# Patient Record
Sex: MALE | Race: WHITE | NOT HISPANIC OR LATINO
[De-identification: names, ages, dates, MRNs, and addresses within clinical notes are randomized per-mention and may not be internally consistent; named-entity substitution may affect disease eponyms.]

---

## 2021-05-12 ENCOUNTER — APPOINTMENT (OUTPATIENT)
Dept: ORTHOPEDIC SURGERY | Facility: CLINIC | Age: 56
End: 2021-05-12
Payer: COMMERCIAL

## 2021-05-12 ENCOUNTER — RESULT REVIEW (OUTPATIENT)
Age: 56
End: 2021-05-12

## 2021-05-12 ENCOUNTER — OUTPATIENT (OUTPATIENT)
Dept: OUTPATIENT SERVICES | Facility: HOSPITAL | Age: 56
LOS: 1 days | End: 2021-05-12
Payer: COMMERCIAL

## 2021-05-12 VITALS — HEIGHT: 73 IN | BODY MASS INDEX: 27.17 KG/M2 | WEIGHT: 205 LBS | RESPIRATION RATE: 16 BRPM

## 2021-05-12 DIAGNOSIS — Z78.9 OTHER SPECIFIED HEALTH STATUS: ICD-10-CM

## 2021-05-12 PROCEDURE — 99072 ADDL SUPL MATRL&STAF TM PHE: CPT

## 2021-05-12 PROCEDURE — 29515 APPLICATION SHORT LEG SPLINT: CPT | Mod: LT

## 2021-05-12 PROCEDURE — 73610 X-RAY EXAM OF ANKLE: CPT | Mod: 26,LT

## 2021-05-12 PROCEDURE — 73630 X-RAY EXAM OF FOOT: CPT | Mod: 26,LT

## 2021-05-12 PROCEDURE — 77071 MNL APPL STRS JT RADIOGRAPHY: CPT

## 2021-05-12 PROCEDURE — 99204 OFFICE O/P NEW MOD 45 MIN: CPT | Mod: 25

## 2021-05-12 PROCEDURE — 73610 X-RAY EXAM OF ANKLE: CPT

## 2021-05-12 PROCEDURE — 73630 X-RAY EXAM OF FOOT: CPT

## 2021-05-12 RX ORDER — IBUPROFEN 200 MG
600 CAPSULE ORAL
Qty: 90 | Refills: 0 | Status: ACTIVE | COMMUNITY
Start: 2021-05-12 | End: 1900-01-01

## 2021-05-15 ENCOUNTER — LABORATORY RESULT (OUTPATIENT)
Age: 56
End: 2021-05-15

## 2021-05-18 ENCOUNTER — APPOINTMENT (OUTPATIENT)
Dept: ORTHOPEDIC SURGERY | Facility: AMBULATORY SURGERY CENTER | Age: 56
End: 2021-05-18

## 2021-05-18 ENCOUNTER — OUTPATIENT (OUTPATIENT)
Dept: OUTPATIENT SERVICES | Facility: HOSPITAL | Age: 56
LOS: 1 days | Discharge: HOME | End: 2021-05-18
Payer: COMMERCIAL

## 2021-05-18 PROCEDURE — 77071 MNL APPL STRS JT RADIOGRAPHY: CPT

## 2021-05-18 PROCEDURE — 27695 REPAIR OF ANKLE LIGAMENT: CPT | Mod: 59,LT

## 2021-05-18 PROCEDURE — 27792 TREATMENT OF ANKLE FRACTURE: CPT | Mod: LT

## 2021-05-18 PROCEDURE — 27829 TREAT LOWER LEG JOINT: CPT | Mod: LT

## 2021-05-18 PROCEDURE — 29898 ANKLE ARTHROSCOPY/SURGERY: CPT | Mod: LT

## 2021-05-19 ENCOUNTER — NON-APPOINTMENT (OUTPATIENT)
Age: 56
End: 2021-05-19

## 2021-06-02 ENCOUNTER — APPOINTMENT (OUTPATIENT)
Dept: ORTHOPEDIC SURGERY | Facility: CLINIC | Age: 56
End: 2021-06-02
Payer: COMMERCIAL

## 2021-06-02 PROCEDURE — 99024 POSTOP FOLLOW-UP VISIT: CPT

## 2021-06-04 ENCOUNTER — NON-APPOINTMENT (OUTPATIENT)
Age: 56
End: 2021-06-04

## 2021-06-30 ENCOUNTER — RESULT REVIEW (OUTPATIENT)
Age: 56
End: 2021-06-30

## 2021-06-30 ENCOUNTER — APPOINTMENT (OUTPATIENT)
Dept: ORTHOPEDIC SURGERY | Facility: CLINIC | Age: 56
End: 2021-06-30
Payer: COMMERCIAL

## 2021-06-30 ENCOUNTER — OUTPATIENT (OUTPATIENT)
Dept: OUTPATIENT SERVICES | Facility: HOSPITAL | Age: 56
LOS: 1 days | End: 2021-06-30
Payer: COMMERCIAL

## 2021-06-30 VITALS — HEIGHT: 73 IN | BODY MASS INDEX: 27.17 KG/M2 | WEIGHT: 205 LBS | RESPIRATION RATE: 16 BRPM

## 2021-06-30 PROCEDURE — 73610 X-RAY EXAM OF ANKLE: CPT

## 2021-06-30 PROCEDURE — 99024 POSTOP FOLLOW-UP VISIT: CPT

## 2021-06-30 PROCEDURE — 73610 X-RAY EXAM OF ANKLE: CPT | Mod: 26,LT

## 2021-06-30 RX ORDER — ASPIRIN 325 MG/1
325 TABLET, FILM COATED ORAL TWICE DAILY
Qty: 60 | Refills: 0 | Status: DISCONTINUED | COMMUNITY
Start: 2021-05-12 | End: 2021-06-30

## 2021-06-30 RX ORDER — GABAPENTIN 300 MG/1
300 CAPSULE ORAL DAILY
Qty: 110 | Refills: 0 | Status: DISCONTINUED | COMMUNITY
Start: 2021-05-12 | End: 2021-06-30

## 2021-06-30 RX ORDER — ONDANSETRON 4 MG/1
4 TABLET ORAL EVERY 8 HOURS
Qty: 15 | Refills: 2 | Status: DISCONTINUED | COMMUNITY
Start: 2021-05-12 | End: 2021-06-30

## 2021-06-30 RX ORDER — OXYCODONE 5 MG/1
5 TABLET ORAL
Qty: 40 | Refills: 0 | Status: DISCONTINUED | COMMUNITY
Start: 2021-05-12 | End: 2021-06-30

## 2021-06-30 RX ORDER — HYDROMORPHONE HYDROCHLORIDE 2 MG/1
2 TABLET ORAL
Qty: 30 | Refills: 0 | Status: DISCONTINUED | COMMUNITY
Start: 2021-05-20 | End: 2021-06-30

## 2021-06-30 RX ORDER — ACETAMINOPHEN 500 MG/1
500 TABLET ORAL
Qty: 60 | Refills: 1 | Status: DISCONTINUED | COMMUNITY
Start: 2021-05-12 | End: 2021-06-30

## 2021-06-30 RX ORDER — DOCUSATE SODIUM 100 MG/1
100 CAPSULE ORAL TWICE DAILY
Qty: 28 | Refills: 3 | Status: DISCONTINUED | COMMUNITY
Start: 2021-05-12 | End: 2021-06-30

## 2021-07-13 ENCOUNTER — NON-APPOINTMENT (OUTPATIENT)
Age: 56
End: 2021-07-13

## 2021-07-22 ENCOUNTER — APPOINTMENT (OUTPATIENT)
Dept: FAMILY MEDICINE | Facility: CLINIC | Age: 56
End: 2021-07-22
Payer: COMMERCIAL

## 2021-07-22 VITALS
TEMPERATURE: 98.3 F | BODY MASS INDEX: 28.36 KG/M2 | WEIGHT: 214 LBS | HEART RATE: 83 BPM | HEIGHT: 73 IN | OXYGEN SATURATION: 96 % | DIASTOLIC BLOOD PRESSURE: 81 MMHG | SYSTOLIC BLOOD PRESSURE: 119 MMHG

## 2021-07-22 DIAGNOSIS — Z80.1 FAMILY HISTORY OF MALIGNANT NEOPLASM OF TRACHEA, BRONCHUS AND LUNG: ICD-10-CM

## 2021-07-22 DIAGNOSIS — Z80.0 FAMILY HISTORY OF MALIGNANT NEOPLASM OF DIGESTIVE ORGANS: ICD-10-CM

## 2021-07-22 DIAGNOSIS — Z84.1 FAMILY HISTORY OF DISORDERS OF KIDNEY AND URETER: ICD-10-CM

## 2021-07-22 DIAGNOSIS — Z86.39 PERSONAL HISTORY OF OTHER ENDOCRINE, NUTRITIONAL AND METABOLIC DISEASE: ICD-10-CM

## 2021-07-22 PROCEDURE — 36415 COLL VENOUS BLD VENIPUNCTURE: CPT

## 2021-07-22 PROCEDURE — G0442 ANNUAL ALCOHOL SCREEN 15 MIN: CPT

## 2021-07-22 PROCEDURE — 99386 PREV VISIT NEW AGE 40-64: CPT | Mod: 25

## 2021-07-22 RX ORDER — TRAMADOL HYDROCHLORIDE 50 MG/1
50 TABLET, COATED ORAL
Qty: 40 | Refills: 0 | Status: DISCONTINUED | COMMUNITY
Start: 2021-07-13 | End: 2021-07-22

## 2021-07-22 NOTE — PLAN
[FreeTextEntry1] : follow up labs, labs drawn in office \par pt. deferred ekg today \par \par extensive discussion on importance of covid 19 vaccine

## 2021-07-22 NOTE — HISTORY OF PRESENT ILLNESS
[FreeTextEntry1] : establish care [de-identified] : 55 yo m presents to establish care. \par Last physical was a few years ago. \par Has not had a PCP visit in years. \par All was normal. \par

## 2021-07-22 NOTE — PHYSICAL EXAM
[Normal Sclera/Conjunctiva] : normal sclera/conjunctiva [Normal Outer Ear/Nose] : the outer ears and nose were normal in appearance [No JVD] : no jugular venous distention [Normal] : affect was normal and insight and judgment were intact [de-identified] : skin lesion on scalp, teardrop sized

## 2021-07-22 NOTE — HEALTH RISK ASSESSMENT
[Good] : ~his/her~  mood as  good [1 or 2 (0 pts)] : 1 or 2 (0 points) [Never (0 pts)] : Never (0 points) [No] : In the past 12 months have you used drugs other than those required for medical reasons? No [0] : 2) Feeling down, depressed, or hopeless: Not at all (0) [PHQ-2 Negative - No further assessment needed] : PHQ-2 Negative - No further assessment needed [Patient declined colonoscopy] : Patient declined colonoscopy [HIV test declined] : HIV test declined [Hepatitis C test declined] : Hepatitis C test declined [None] : None [With Significant Other] : lives with significant other [Employed] : employed [Significant Other] : lives with significant other [Sexually Active] : sexually active [Feels Safe at Home] : Feels safe at home [Fully functional (bathing, dressing, toileting, transferring, walking, feeding)] : Fully functional (bathing, dressing, toileting, transferring, walking, feeding) [Fully functional (using the telephone, shopping, preparing meals, housekeeping, doing laundry, using] : Fully functional and needs no help or supervision to perform IADLs (using the telephone, shopping, preparing meals, housekeeping, doing laundry, using transportation, managing medications and managing finances) [] : No [Audit-CScore] : 0 [de-identified] : limited [de-identified] : fruits, veggies  [[id number]] : 0 [Change in mental status noted] : No change in mental status noted [Language] : denies difficulty with language [High Risk Behavior] : no high risk behavior [Reports changes in hearing] : Reports no changes in hearing [Reports changes in vision] : Reports no changes in vision [FreeTextEntry2] : construction,

## 2021-07-28 LAB
25(OH)D3 SERPL-MCNC: 44.3 NG/ML
ALBUMIN SERPL ELPH-MCNC: 5 G/DL
ALP BLD-CCNC: 63 U/L
ALT SERPL-CCNC: 19 U/L
ANION GAP SERPL CALC-SCNC: 12 MMOL/L
APPEARANCE: CLEAR
AST SERPL-CCNC: 18 U/L
BASOPHILS # BLD AUTO: 0.06 K/UL
BASOPHILS NFR BLD AUTO: 1 %
BILIRUB SERPL-MCNC: 0.3 MG/DL
BILIRUBIN URINE: NEGATIVE
BLOOD URINE: NORMAL
BUN SERPL-MCNC: 17 MG/DL
CALCIUM SERPL-MCNC: 10.3 MG/DL
CHLORIDE SERPL-SCNC: 101 MMOL/L
CHOLEST SERPL-MCNC: 181 MG/DL
CO2 SERPL-SCNC: 24 MMOL/L
COLOR: YELLOW
CREAT SERPL-MCNC: 1.01 MG/DL
EOSINOPHIL # BLD AUTO: 0.11 K/UL
EOSINOPHIL NFR BLD AUTO: 1.9 %
ESTIMATED AVERAGE GLUCOSE: 103 MG/DL
GLUCOSE QUALITATIVE U: NEGATIVE
GLUCOSE SERPL-MCNC: 94 MG/DL
HBA1C MFR BLD HPLC: 5.2 %
HCT VFR BLD CALC: 43.8 %
HDLC SERPL-MCNC: 56 MG/DL
HGB BLD-MCNC: 14.6 G/DL
IMM GRANULOCYTES NFR BLD AUTO: 0.2 %
KETONES URINE: NEGATIVE
LDLC SERPL CALC-MCNC: 104 MG/DL
LEUKOCYTE ESTERASE URINE: NEGATIVE
LYMPHOCYTES # BLD AUTO: 1.79 K/UL
LYMPHOCYTES NFR BLD AUTO: 30.3 %
MAN DIFF?: NORMAL
MCHC RBC-ENTMCNC: 31.1 PG
MCHC RBC-ENTMCNC: 33.3 GM/DL
MCV RBC AUTO: 93.4 FL
MONOCYTES # BLD AUTO: 0.55 K/UL
MONOCYTES NFR BLD AUTO: 9.3 %
NEUTROPHILS # BLD AUTO: 3.39 K/UL
NEUTROPHILS NFR BLD AUTO: 57.3 %
NITRITE URINE: NEGATIVE
NONHDLC SERPL-MCNC: 125 MG/DL
PH URINE: 6
PLATELET # BLD AUTO: 242 K/UL
POTASSIUM SERPL-SCNC: 4.1 MMOL/L
PROT SERPL-MCNC: 7.3 G/DL
PROTEIN URINE: NEGATIVE
PSA FREE FLD-MCNC: 24 %
PSA FREE SERPL-MCNC: 1.09 NG/ML
PSA SERPL-MCNC: 4.58 NG/ML
RBC # BLD: 4.69 M/UL
RBC # FLD: 13.3 %
SODIUM SERPL-SCNC: 138 MMOL/L
SPECIFIC GRAVITY URINE: 1.02
TRIGL SERPL-MCNC: 105 MG/DL
TSH SERPL-ACNC: 3.69 UIU/ML
UROBILINOGEN URINE: NORMAL
WBC # FLD AUTO: 5.91 K/UL

## 2021-08-04 ENCOUNTER — APPOINTMENT (OUTPATIENT)
Dept: FAMILY MEDICINE | Facility: CLINIC | Age: 56
End: 2021-08-04
Payer: COMMERCIAL

## 2021-08-04 PROCEDURE — 99213 OFFICE O/P EST LOW 20 MIN: CPT | Mod: 95

## 2021-08-11 ENCOUNTER — RESULT REVIEW (OUTPATIENT)
Age: 56
End: 2021-08-11

## 2021-08-11 ENCOUNTER — OUTPATIENT (OUTPATIENT)
Dept: OUTPATIENT SERVICES | Facility: HOSPITAL | Age: 56
LOS: 1 days | End: 2021-08-11
Payer: COMMERCIAL

## 2021-08-11 ENCOUNTER — APPOINTMENT (OUTPATIENT)
Dept: ORTHOPEDIC SURGERY | Facility: CLINIC | Age: 56
End: 2021-08-11
Payer: COMMERCIAL

## 2021-08-11 VITALS — RESPIRATION RATE: 16 BRPM | HEIGHT: 73 IN | WEIGHT: 214 LBS | BODY MASS INDEX: 28.36 KG/M2

## 2021-08-11 PROCEDURE — 99024 POSTOP FOLLOW-UP VISIT: CPT

## 2021-08-11 PROCEDURE — 73610 X-RAY EXAM OF ANKLE: CPT

## 2021-08-11 PROCEDURE — 73610 X-RAY EXAM OF ANKLE: CPT | Mod: 26,LT

## 2021-08-11 RX ORDER — MELOXICAM 15 MG/1
15 TABLET ORAL
Qty: 90 | Refills: 0 | Status: ACTIVE | COMMUNITY
Start: 2021-08-11 | End: 1900-01-01

## 2021-08-23 ENCOUNTER — APPOINTMENT (OUTPATIENT)
Dept: DERMATOLOGY | Facility: CLINIC | Age: 56
End: 2021-08-23

## 2021-08-25 NOTE — PLAN
[FreeTextEntry1] : reviewed risks, benefits of meds\par discussed importance of taking daily, 30 min prior to food and drink \par will follow up in 8 weeks to review how med is working

## 2021-08-25 NOTE — HISTORY OF PRESENT ILLNESS
[Home] : at home, [unfilled] , at the time of the visit. [Medical Office: ([facility])___] : at the medical office located in  [Verbal consent obtained from patient] : the patient, [unfilled] [FreeTextEntry8] : cc: thyroid meds \par \par 57 yo m presents feeling fatigued. \par Interested in adjusting dose of thyroid meds. \par Takes daily in AM 30 min prior to first meal. \par Feels fatigued. \par Feels as though would benefit from change in dose. \par No complaints today. \par Still not interested in vaccination.

## 2021-08-30 ENCOUNTER — APPOINTMENT (OUTPATIENT)
Dept: UROLOGY | Facility: CLINIC | Age: 56
End: 2021-08-30
Payer: COMMERCIAL

## 2021-08-30 VITALS — HEART RATE: 91 BPM | TEMPERATURE: 98.1 F | DIASTOLIC BLOOD PRESSURE: 78 MMHG | SYSTOLIC BLOOD PRESSURE: 120 MMHG

## 2021-08-30 PROCEDURE — 51798 US URINE CAPACITY MEASURE: CPT

## 2021-08-30 PROCEDURE — 99204 OFFICE O/P NEW MOD 45 MIN: CPT | Mod: 25

## 2021-08-31 NOTE — ASSESSMENT
[FreeTextEntry1] : 56 year old male with Hx of hypothyroidism and BPH with elevated PSA.\par father - had prostate cancer. \par \par BPH with LUTS\par Discussed etiology and other treatment options\par Recommend fluid reduction and coffee intake (1 cup/day) \par Tamsulosin 0.4 mg, informed patient of use and s/e\par PVR 66 ml - r/o urinary retention\par \par Elevated PSA\par previous biopsy in 2008 was negative per patient\par PSA 4.58 July 2021.\par \par Repeat PSA , UA, UCX\par MRI prostate w/wo contrast  - if PSA is elevated (>3.5)\par \par Follow up in 6 weeks\par

## 2021-08-31 NOTE — PHYSICAL EXAM
[General Appearance - Well Developed] : well developed [General Appearance - Well Nourished] : well nourished [Normal Appearance] : normal appearance [Well Groomed] : well groomed [General Appearance - In No Acute Distress] : no acute distress [Edema] : no peripheral edema [Respiration, Rhythm And Depth] : normal respiratory rhythm and effort [Exaggerated Use Of Accessory Muscles For Inspiration] : no accessory muscle use [Abdomen Soft] : soft [Abdomen Tenderness] : non-tender [Costovertebral Angle Tenderness] : no ~M costovertebral angle tenderness [Urethral Meatus] : meatus normal [Urinary Bladder Findings] : the bladder was normal on palpation [Scrotum] : the scrotum was normal [Testes Mass (___cm)] : there were no testicular masses [No Prostate Nodules] : no prostate nodules [Normal Station and Gait] : the gait and station were normal for the patient's age [] : no rash [No Focal Deficits] : no focal deficits [Oriented To Time, Place, And Person] : oriented to person, place, and time [Affect] : the affect was normal [Mood] : the mood was normal [Not Anxious] : not anxious [No Palpable Adenopathy] : no palpable adenopathy [FreeTextEntry1] : left ankle fracture s/p surgical repair

## 2021-08-31 NOTE — HISTORY OF PRESENT ILLNESS
[Currently Experiencing ___] :  [unfilled] [Urinary Urgency] : urinary urgency [Urinary Frequency] : urinary frequency [Nocturia] : nocturia [Straining] : straining [Weak Stream] : weak stream [Post-Void Dribbling] : post-void dribbling [None] : None [FreeTextEntry1] : This is a 56 year old male with Hx of hypothyroidism and BPH.  Referred by Dr Charles\par Previously seen by urologist at [place]. Patient reports prostate biopsy in 2008, negative per patient. ?Elevated PSA at that time, patient unsure. \par Presents to clinic to establish care for BPH with elevated PSA\par PSA 4.58 July 2021.\par patient complaints of urinary urgency, frequency, nocturia 6-7X, post void dribbling, weak to moderate FOS with straining. \par Patient is currently taking tamsulosin 0.4 mg from his friend.\par Denies dysuria, hematuria, flank pain\par +Claustrophobia\par         \par Social Hx: denies smoking or alcohol \par FHx: Father - prostate cancer

## 2021-08-31 NOTE — END OF VISIT
[FreeTextEntry3] : 57yo male with h/o BPH, h/o elevated PSA, prior biopsy several years ago for unknown indication, presume elevated PSA, +family history of PCa. Reviewed  PCa screening and options. Discussed MRI but patient is claustrophobic. He agreed to proceeding with biopsy if repeat PSA > 3.5. Discussed tamsulosin and behavioral modification. F/u 6 weeks.

## 2021-09-01 ENCOUNTER — NON-APPOINTMENT (OUTPATIENT)
Age: 56
End: 2021-09-01

## 2021-10-06 ENCOUNTER — APPOINTMENT (OUTPATIENT)
Dept: ORTHOPEDIC SURGERY | Facility: CLINIC | Age: 56
End: 2021-10-06
Payer: COMMERCIAL

## 2021-10-06 DIAGNOSIS — S82.832A OTHER FRACTURE OF UPPER AND LOWER END OF LEFT FIBULA, INITIAL ENCOUNTER FOR CLOSED FRACTURE: ICD-10-CM

## 2021-10-06 DIAGNOSIS — S90.522A BLISTER (NONTHERMAL), LEFT ANKLE, INITIAL ENCOUNTER: ICD-10-CM

## 2021-10-06 DIAGNOSIS — M25.572 PAIN IN LEFT ANKLE AND JOINTS OF LEFT FOOT: ICD-10-CM

## 2021-10-06 PROCEDURE — 99213 OFFICE O/P EST LOW 20 MIN: CPT

## 2021-10-07 VITALS — RESPIRATION RATE: 16 BRPM | HEIGHT: 73 IN | WEIGHT: 214 LBS | BODY MASS INDEX: 28.36 KG/M2

## 2021-10-15 ENCOUNTER — APPOINTMENT (OUTPATIENT)
Dept: PHYSICAL MEDICINE AND REHAB | Facility: CLINIC | Age: 56
End: 2021-10-15
Payer: COMMERCIAL

## 2021-10-15 VITALS — OXYGEN SATURATION: 97 % | WEIGHT: 214 LBS | RESPIRATION RATE: 16 BRPM | HEIGHT: 73 IN | BODY MASS INDEX: 28.36 KG/M2

## 2021-10-15 PROCEDURE — 99202 OFFICE O/P NEW SF 15 MIN: CPT

## 2021-10-26 NOTE — HISTORY OF PRESENT ILLNESS
[FreeTextEntry1] : Referring Physician: Dr. Darrell Christopher MD \par \par Mr. DREW GOODMAN is a very pleasant 56 year male who comes in for evaluation of LEFT LEG PAIN that has been ongoing for 4 weeks without any specific injury or inciting event. Patient has tried Meloxicam which helps relief. The pain is located primarily on his LEFT LEG intermittent and described as achy and sharp. The pain is rated as 3/10 and ranges from 3-9/10. The patient's symptoms are aggravated by weight bearing and alleviated by nothing. The patient is currently not working. The patient denies any night pain, numbness/tingling, weakness, or bowel/bladder dysfunction. The patient has no other complaints at this time.\par

## 2021-10-26 NOTE — ASSESSMENT
[FreeTextEntry1] : Impression:\par 1. LEFT S1 Radiculopathy\par \par Plan: The history and physical examination were reviewed. The diagnosis was discussed with the patient along with treatment options including physical therapy, oral medication, interventional spine procedures, and surgery; as well as alternative therapeutics such as acupuncture and massage. We also discussed the importance of biomechanics, ergonomics, and posture as they pertain to the current condition as well as overall health and well being. I am offered the patient a course of physical therapy,  however he would like to proceed with HEP for now. A written program was provided and review in the office today. The patient was educated on red flag symptoms and was instructed to call the office should the current condition worsen or new symptoms develop. The patient will follow up in 2-3 months. The patient expressed verbal understanding and is in agreement with the plan of care. All of the patient's questions and concerns were addressed during today's visit.

## 2021-10-26 NOTE — PHYSICAL EXAM
[FreeTextEntry1] : LUMBAR\par GEN: AAOx3. NAD.\par INSP: Spine alignment midline. No evidence of scoliosis.\par STANCE: Trendelenburg/SLS (-) R (-) L. \par GAIT: (-) Antalgic. Normal reciprocating heel to toe\par SENS: Grossly intact to LT BLE.\par REFLEXES: Symmetric patella, achilles. \par TONE: Normal. No clonus.\par SPECIAL: SLR/Slump (-) R (+) L.   Gaenslen (-) R (-) L.\par                 FADIR (-) R (-) L.          WIN (-) R (-) L.  \par PALP: Midline/Spinous processes (-).   Paraspinals (-) R  (-) L.   \par            QL (-) R (-) L.      SIJ (-) R (-) L.            GTB (-) R (-) L.\par \par LS ROM:                                                              \par Flex               Full. Axial Sx (-). LLE Sx (+)\par Ext                 Full. Axial Sx (-). BLE Sx (-)\par Lat Flex         Full. Axial Sx (-). BLE Sx (-)       \par Rotation         Full. Axial Sx (-). BLE Sx (-) \par Oblique Ext   Full. Axial Sx (-). BLE Sx (-)  \par \par HIP ROM:      RIGHT           LEFT\par Flex             Full. (-)          Full. (-)\par IR                 Full. (-)          Full. (-)\par ER                Full. (-)          Full. (-)\par \par STRENGTH:    RIGHT      LEFT\par Hip Flex            5/5 5/5\par Hip ABd            5/5 5/5\par Knee Ext           5/5 5/5\par Ankle DF           5/5 5/5\par Ankle PF           5/5 5/5\par EHL                   5/5 5/5\par EV                    5/5 5/5

## 2021-11-22 ENCOUNTER — APPOINTMENT (OUTPATIENT)
Dept: ORTHOPEDIC SURGERY | Facility: CLINIC | Age: 56
End: 2021-11-22
Payer: COMMERCIAL

## 2021-11-22 VITALS — RESPIRATION RATE: 16 BRPM | BODY MASS INDEX: 28.36 KG/M2 | WEIGHT: 214 LBS | HEIGHT: 73 IN

## 2021-11-22 PROCEDURE — 99213 OFFICE O/P EST LOW 20 MIN: CPT

## 2021-11-22 PROCEDURE — 73610 X-RAY EXAM OF ANKLE: CPT | Mod: LT

## 2021-11-30 PROBLEM — S90.522A: Status: RESOLVED | Noted: 2021-05-12 | Resolved: 2021-07-22

## 2021-11-30 PROBLEM — S82.832A OTHER CLOSED FRACTURE OF DISTAL END OF LEFT FIBULA, INITIAL ENCOUNTER: Status: RESOLVED | Noted: 2021-05-12 | Resolved: 2021-07-22

## 2021-11-30 PROBLEM — M25.572 ACUTE LEFT ANKLE PAIN: Status: RESOLVED | Noted: 2021-05-12 | Resolved: 2021-07-22

## 2021-12-09 ENCOUNTER — APPOINTMENT (OUTPATIENT)
Dept: FAMILY MEDICINE | Facility: CLINIC | Age: 56
End: 2021-12-09

## 2021-12-27 ENCOUNTER — APPOINTMENT (OUTPATIENT)
Dept: FAMILY MEDICINE | Facility: CLINIC | Age: 56
End: 2021-12-27

## 2022-01-05 ENCOUNTER — RX RENEWAL (OUTPATIENT)
Age: 57
End: 2022-01-05

## 2022-02-18 ENCOUNTER — APPOINTMENT (OUTPATIENT)
Dept: ORTHOPEDIC SURGERY | Facility: CLINIC | Age: 57
End: 2022-02-18

## 2022-02-25 ENCOUNTER — APPOINTMENT (OUTPATIENT)
Dept: FAMILY MEDICINE | Facility: CLINIC | Age: 57
End: 2022-02-25
Payer: COMMERCIAL

## 2022-02-25 ENCOUNTER — LABORATORY RESULT (OUTPATIENT)
Age: 57
End: 2022-02-25

## 2022-02-25 VITALS
HEIGHT: 73 IN | WEIGHT: 218 LBS | DIASTOLIC BLOOD PRESSURE: 79 MMHG | TEMPERATURE: 97.7 F | BODY MASS INDEX: 28.89 KG/M2 | HEART RATE: 90 BPM | SYSTOLIC BLOOD PRESSURE: 120 MMHG | OXYGEN SATURATION: 100 %

## 2022-02-25 PROCEDURE — 99214 OFFICE O/P EST MOD 30 MIN: CPT | Mod: 25

## 2022-02-25 PROCEDURE — 36415 COLL VENOUS BLD VENIPUNCTURE: CPT

## 2022-02-25 PROCEDURE — 81002 URINALYSIS NONAUTO W/O SCOPE: CPT

## 2022-02-25 RX ORDER — TAMSULOSIN HYDROCHLORIDE 0.4 MG/1
0.4 CAPSULE ORAL
Qty: 30 | Refills: 3 | Status: DISCONTINUED | COMMUNITY
Start: 2021-08-30 | End: 2022-02-25

## 2022-02-25 RX ORDER — BACITRACIN 500 [IU]/G
500 OINTMENT TOPICAL TWICE DAILY
Qty: 1 | Refills: 0 | Status: ACTIVE | COMMUNITY
Start: 2022-02-25 | End: 1900-01-01

## 2022-02-25 NOTE — PLAN
[FreeTextEntry1] : follow up labs, labs drawn in office \par will follow up urine as well \par \par anxiety with flying \par will do trial of benzo, only for flight \par patient aware not to mix with drugs or alcohol \par will take one 30 min before boarding, will take another midflight if needed \par aware of risk of dependency \par mentioned has used them before for flying

## 2022-02-25 NOTE — HISTORY OF PRESENT ILLNESS
[FreeTextEntry1] : follow up thyroid \par follow up cholesterol \par follow up PSA \par claustophobia  [de-identified] : 55 yo m presents to discuss his thyroid. \par Was on a new dose of thyroid medication, interested in repeating labs. \par \par Cholesterol was elevated last visit as well. Here to repeat it today as well. \par \par PSA was elevated last visit as well, was followed with urology. Here to repeat it. \par \par Mentioned claustrophobic, worse with flying, in need of a medication to assist.

## 2022-03-01 ENCOUNTER — NON-APPOINTMENT (OUTPATIENT)
Age: 57
End: 2022-03-01

## 2022-03-01 LAB
ALBUMIN SERPL ELPH-MCNC: 4.9 G/DL
ALP BLD-CCNC: 56 U/L
ALT SERPL-CCNC: 24 U/L
ANION GAP SERPL CALC-SCNC: 13 MMOL/L
APPEARANCE: CLEAR
APPEARANCE: CLEAR
AST SERPL-CCNC: 35 U/L
BACTERIA: NEGATIVE
BILIRUB SERPL-MCNC: 0.3 MG/DL
BILIRUBIN URINE: NEGATIVE
BILIRUBIN URINE: NEGATIVE
BLOOD URINE: ABNORMAL
BLOOD URINE: ABNORMAL
BUN SERPL-MCNC: 20 MG/DL
CALCIUM SERPL-MCNC: 9.9 MG/DL
CHLORIDE SERPL-SCNC: 105 MMOL/L
CHOLEST SERPL-MCNC: 154 MG/DL
CO2 SERPL-SCNC: 22 MMOL/L
COLOR: NORMAL
COLOR: NORMAL
CREAT SERPL-MCNC: 1.04 MG/DL
ESTIMATED AVERAGE GLUCOSE: 114 MG/DL
GLUCOSE QUALITATIVE U: NEGATIVE
GLUCOSE QUALITATIVE U: NEGATIVE
GLUCOSE SERPL-MCNC: 100 MG/DL
HBA1C MFR BLD HPLC: 5.6 %
HDLC SERPL-MCNC: 53 MG/DL
HYALINE CASTS: 1 /LPF
KETONES URINE: NEGATIVE
KETONES URINE: NEGATIVE
LDLC SERPL CALC-MCNC: 83 MG/DL
LEUKOCYTE ESTERASE URINE: NEGATIVE
LEUKOCYTE ESTERASE URINE: NEGATIVE
MICROSCOPIC-UA: NORMAL
NITRITE URINE: NEGATIVE
NITRITE URINE: NEGATIVE
NONHDLC SERPL-MCNC: 101 MG/DL
PH URINE: 6.5
PH URINE: 6.5
POTASSIUM SERPL-SCNC: 4.3 MMOL/L
PROT SERPL-MCNC: 7.2 G/DL
PROTEIN URINE: NEGATIVE
PROTEIN URINE: NEGATIVE
PSA FREE FLD-MCNC: 14 %
PSA FREE SERPL-MCNC: 0.46 NG/ML
PSA SERPL-MCNC: 3.32 NG/ML
RED BLOOD CELLS URINE: 7 /HPF
SODIUM SERPL-SCNC: 140 MMOL/L
SPECIFIC GRAVITY URINE: 1.02
SPECIFIC GRAVITY URINE: 1.02
SQUAMOUS EPITHELIAL CELLS: 0 /HPF
TRIGL SERPL-MCNC: 90 MG/DL
TSH SERPL-ACNC: 1.17 UIU/ML
UROBILINOGEN URINE: NORMAL
UROBILINOGEN URINE: NORMAL
WHITE BLOOD CELLS URINE: 0 /HPF

## 2022-03-09 ENCOUNTER — APPOINTMENT (OUTPATIENT)
Dept: UROLOGY | Facility: CLINIC | Age: 57
End: 2022-03-09
Payer: COMMERCIAL

## 2022-03-09 VITALS
SYSTOLIC BLOOD PRESSURE: 125 MMHG | RESPIRATION RATE: 16 BRPM | DIASTOLIC BLOOD PRESSURE: 86 MMHG | HEIGHT: 73 IN | TEMPERATURE: 97.8 F | BODY MASS INDEX: 28.49 KG/M2 | WEIGHT: 215 LBS | HEART RATE: 68 BPM

## 2022-03-09 LAB
BILIRUB UR QL STRIP: NORMAL
CLARITY UR: CLEAR
COLLECTION METHOD: NORMAL
GLUCOSE UR-MCNC: NORMAL
HCG UR QL: 0.2 EU/DL
HGB UR QL STRIP.AUTO: NORMAL
KETONES UR-MCNC: NORMAL
LEUKOCYTE ESTERASE UR QL STRIP: NORMAL
NITRITE UR QL STRIP: NORMAL
PH UR STRIP: 5.5
PROT UR STRIP-MCNC: NORMAL
SP GR UR STRIP: 1.02

## 2022-03-09 PROCEDURE — 81003 URINALYSIS AUTO W/O SCOPE: CPT | Mod: QW

## 2022-03-09 PROCEDURE — 99214 OFFICE O/P EST MOD 30 MIN: CPT

## 2022-03-09 NOTE — HISTORY OF PRESENT ILLNESS
[FreeTextEntry1] : 56 year old man with history of elevated PSA, LUTS, presents with microscopic hematuria.\par \par Patient presents today for microscopic hematuria (7 RBCs pHPF). No prior gross hematuria or microscopic hematuria. 10 year smoking history. No personal or family history of  malignancy. No prior kidney stones or trauma.\par \par He previously saw Dr. Peres in 2021 for elevated PSA. It was 4.58 at that time. He was supposed to get a repeat PSA with possible MRI vs biopsy, however, this was not completed. He had a repeat PSA with Dr. Landon last week that was 3.32. Negative prostate biopsy in 2008.\par \par He has chronic LUTS characterized by urgency, frequency, nocturia, dribbling, weak stream. He has tried flomax in the past, but stopped due to side effects. He did not feel the benefit was worth taking the medication. He denies fevers, chills, dysuria.\par \par

## 2022-03-09 NOTE — ASSESSMENT
[FreeTextEntry1] : Patient with microscopic hematuria. We discussed the typical causes and implication for hematuria. I stated that blood in the urine may be due to a multitude of different reasons including urinary stone disease, infection, and trauma. The most concerning cause is an occult malignancy. For patients with microscopic hematuria, the risk is low (<5%) and elevate among those with gross hematuria (up to 15%). I have recommended formal hematuria evaluation with CT urogram, cystoscopy and urine cytology.\par \par Regarding chronic LUTS, patient has tried medication in the past and is not currently interested in exploring further treatment. He has a history of elevated PSA, up to 4.6 in 2021 and down to 3.3 in 2/2022. He had a negative prostate biopsy in 2008. Given normalization of PSA at this time, no further workup needed. Plan to repeat PSA yearly.\par \par  - CT urogram, cystoscopy and urine cytology\par  - PSA yearly\par

## 2022-03-09 NOTE — LETTER BODY
[Dear  ___] : Dear  [unfilled], [Courtesy Letter:] : I had the pleasure of seeing your patient, [unfilled], in my office today. [Please see my note below.] : Please see my note below. [Sincerely,] : Sincerely, [Consult Closing:] : Thank you very much for allowing me to participate in the care of this patient.  If you have any questions, please do not hesitate to contact me. [FreeTextEntry1] : For his microscopic hematuria, I will plan for a hematuria evaluation with CT urogram, cystoscopy and urine cytology. I discussed importance of continued PSA surveillance yearly given previous elevation. I don't think he needs MRI or biopsy at this time. [FreeTextEntry3] : Mery Jade MD

## 2022-03-23 ENCOUNTER — APPOINTMENT (OUTPATIENT)
Dept: UROLOGY | Facility: CLINIC | Age: 57
End: 2022-03-23

## 2022-04-05 LAB
APPEARANCE: CLEAR
BACTERIA UR CULT: NORMAL
BACTERIA: NEGATIVE
BILIRUBIN URINE: NEGATIVE
BLOOD URINE: ABNORMAL
COLOR: YELLOW
GLUCOSE QUALITATIVE U: NEGATIVE
HYALINE CASTS: 0 /LPF
KETONES URINE: NEGATIVE
LEUKOCYTE ESTERASE URINE: NEGATIVE
MICROSCOPIC-UA: NORMAL
NITRITE URINE: NEGATIVE
PH URINE: 6
PROTEIN URINE: NEGATIVE
RED BLOOD CELLS URINE: 3 /HPF
SPECIFIC GRAVITY URINE: 1.02
SQUAMOUS EPITHELIAL CELLS: 0 /HPF
URINE CYTOLOGY: NORMAL
UROBILINOGEN URINE: NORMAL
WHITE BLOOD CELLS URINE: 0 /HPF

## 2022-05-09 ENCOUNTER — LABORATORY RESULT (OUTPATIENT)
Age: 57
End: 2022-05-09

## 2022-05-09 ENCOUNTER — APPOINTMENT (OUTPATIENT)
Dept: FAMILY MEDICINE | Facility: CLINIC | Age: 57
End: 2022-05-09
Payer: COMMERCIAL

## 2022-05-09 VITALS
BODY MASS INDEX: 27.96 KG/M2 | HEART RATE: 73 BPM | HEIGHT: 73 IN | TEMPERATURE: 98.1 F | SYSTOLIC BLOOD PRESSURE: 102 MMHG | DIASTOLIC BLOOD PRESSURE: 71 MMHG | WEIGHT: 211 LBS | OXYGEN SATURATION: 97 %

## 2022-05-09 PROCEDURE — 99214 OFFICE O/P EST MOD 30 MIN: CPT | Mod: 25

## 2022-05-09 PROCEDURE — 81002 URINALYSIS NONAUTO W/O SCOPE: CPT

## 2022-05-09 PROCEDURE — 36415 COLL VENOUS BLD VENIPUNCTURE: CPT

## 2022-05-09 NOTE — PLAN
[FreeTextEntry1] : follow up labs-- blood and urine, performed in office \par encouraged follow up with uro for scope \par reviewed urology note with patient

## 2022-05-10 ENCOUNTER — NON-APPOINTMENT (OUTPATIENT)
Age: 57
End: 2022-05-10

## 2022-05-10 LAB
APPEARANCE: CLEAR
APPEARANCE: CLEAR
BACTERIA: NEGATIVE
BILIRUBIN URINE: NEGATIVE
BILIRUBIN URINE: NEGATIVE
BLOOD URINE: ABNORMAL
BLOOD URINE: ABNORMAL
COLOR: NORMAL
COLOR: NORMAL
GLUCOSE QUALITATIVE U: NEGATIVE
GLUCOSE QUALITATIVE U: NEGATIVE
HYALINE CASTS: 0 /LPF
KETONES URINE: NEGATIVE
KETONES URINE: NEGATIVE
LEUKOCYTE ESTERASE URINE: NEGATIVE
LEUKOCYTE ESTERASE URINE: NEGATIVE
MICROSCOPIC-UA: NORMAL
NITRITE URINE: NEGATIVE
NITRITE URINE: NEGATIVE
PH URINE: 6
PH URINE: 6
PROTEIN URINE: NEGATIVE
PROTEIN URINE: NEGATIVE
PSA FREE FLD-MCNC: 11 %
PSA FREE SERPL-MCNC: 0.32 NG/ML
PSA SERPL-MCNC: 2.77 NG/ML
RED BLOOD CELLS URINE: 3 /HPF
SPECIFIC GRAVITY URINE: 1.01
SPECIFIC GRAVITY URINE: 1.01
SQUAMOUS EPITHELIAL CELLS: 0 /HPF
UROBILINOGEN URINE: NORMAL
UROBILINOGEN URINE: NORMAL
WHITE BLOOD CELLS URINE: 0 /HPF

## 2022-05-13 ENCOUNTER — NON-APPOINTMENT (OUTPATIENT)
Age: 57
End: 2022-05-13

## 2022-05-18 ENCOUNTER — APPOINTMENT (OUTPATIENT)
Dept: UROLOGY | Facility: CLINIC | Age: 57
End: 2022-05-18
Payer: COMMERCIAL

## 2022-05-18 VITALS
RESPIRATION RATE: 18 BRPM | BODY MASS INDEX: 27.83 KG/M2 | DIASTOLIC BLOOD PRESSURE: 72 MMHG | SYSTOLIC BLOOD PRESSURE: 108 MMHG | WEIGHT: 210 LBS | TEMPERATURE: 98.2 F | HEART RATE: 77 BPM | HEIGHT: 73 IN

## 2022-05-18 PROCEDURE — 52000 CYSTOURETHROSCOPY: CPT

## 2022-05-18 PROCEDURE — A4550 SURGICAL TRAYS: CPT

## 2022-05-19 LAB
BILIRUB UR QL STRIP: NORMAL
CLARITY UR: CLEAR
COLLECTION METHOD: NORMAL
GLUCOSE UR-MCNC: NORMAL
HCG UR QL: 0.2 EU/DL
HGB UR QL STRIP.AUTO: NORMAL
KETONES UR-MCNC: NORMAL
LEUKOCYTE ESTERASE UR QL STRIP: NORMAL
NITRITE UR QL STRIP: NORMAL
PH UR STRIP: 6.5
PROT UR STRIP-MCNC: NORMAL
SP GR UR STRIP: 1.01

## 2022-06-16 ENCOUNTER — RX RENEWAL (OUTPATIENT)
Age: 57
End: 2022-06-16

## 2022-06-22 ENCOUNTER — APPOINTMENT (OUTPATIENT)
Dept: ORTHOPEDIC SURGERY | Facility: CLINIC | Age: 57
End: 2022-06-22
Payer: COMMERCIAL

## 2022-06-22 VITALS — WEIGHT: 210 LBS | RESPIRATION RATE: 16 BRPM | BODY MASS INDEX: 27.83 KG/M2 | HEIGHT: 73 IN

## 2022-06-22 DIAGNOSIS — S93.432A SPRAIN OF TIBIOFIBULAR LIGAMENT OF LEFT ANKLE, INITIAL ENCOUNTER: ICD-10-CM

## 2022-06-22 PROCEDURE — 73610 X-RAY EXAM OF ANKLE: CPT | Mod: LT

## 2022-06-22 PROCEDURE — 99213 OFFICE O/P EST LOW 20 MIN: CPT

## 2022-06-23 PROBLEM — S93.432A SYNDESMOTIC DISRUPTION OF LEFT ANKLE, INITIAL ENCOUNTER: Status: RESOLVED | Noted: 2021-05-12 | Resolved: 2022-06-23

## 2022-09-14 ENCOUNTER — RX RENEWAL (OUTPATIENT)
Age: 57
End: 2022-09-14

## 2022-12-16 ENCOUNTER — RX RENEWAL (OUTPATIENT)
Age: 57
End: 2022-12-16

## 2023-02-24 ENCOUNTER — APPOINTMENT (OUTPATIENT)
Dept: FAMILY MEDICINE | Facility: CLINIC | Age: 58
End: 2023-02-24
Payer: COMMERCIAL

## 2023-02-24 VITALS
HEART RATE: 66 BPM | DIASTOLIC BLOOD PRESSURE: 76 MMHG | SYSTOLIC BLOOD PRESSURE: 113 MMHG | BODY MASS INDEX: 27.43 KG/M2 | TEMPERATURE: 98.2 F | WEIGHT: 207 LBS | HEIGHT: 73 IN | OXYGEN SATURATION: 98 %

## 2023-02-24 DIAGNOSIS — Z87.898 PERSONAL HISTORY OF OTHER SPECIFIED CONDITIONS: ICD-10-CM

## 2023-02-24 DIAGNOSIS — L98.9 DISORDER OF THE SKIN AND SUBCUTANEOUS TISSUE, UNSPECIFIED: ICD-10-CM

## 2023-02-24 DIAGNOSIS — F40.243 FEAR OF FLYING: ICD-10-CM

## 2023-02-24 DIAGNOSIS — R31.29 OTHER MICROSCOPIC HEMATURIA: ICD-10-CM

## 2023-02-24 DIAGNOSIS — M62.452 CONTRACTURE OF MUSCLE, LEFT THIGH: ICD-10-CM

## 2023-02-24 DIAGNOSIS — M54.16 RADICULOPATHY, LUMBAR REGION: ICD-10-CM

## 2023-02-24 DIAGNOSIS — N13.8 BENIGN PROSTATIC HYPERPLASIA WITH LOWER URINARY TRACT SYMPMS: ICD-10-CM

## 2023-02-24 DIAGNOSIS — Z00.00 ENCOUNTER FOR GENERAL ADULT MEDICAL EXAMINATION W/OUT ABNORMAL FINDINGS: ICD-10-CM

## 2023-02-24 DIAGNOSIS — N40.1 BENIGN PROSTATIC HYPERPLASIA WITH LOWER URINARY TRACT SYMPMS: ICD-10-CM

## 2023-02-24 PROCEDURE — 36415 COLL VENOUS BLD VENIPUNCTURE: CPT

## 2023-02-24 PROCEDURE — 99396 PREV VISIT EST AGE 40-64: CPT | Mod: 25

## 2023-02-24 NOTE — HEALTH RISK ASSESSMENT
[Good] : ~his/her~  mood as  good [No] : In the past 12 months have you used drugs other than those required for medical reasons? No [0] : 2) Feeling down, depressed, or hopeless: Not at all (0) [PHQ-2 Negative - No further assessment needed] : PHQ-2 Negative - No further assessment needed [Patient reported colonoscopy was normal] : Patient reported colonoscopy was normal [HIV test declined] : HIV test declined [Hepatitis C test declined] : Hepatitis C test declined [None] : None [With Significant Other] : lives with significant other [Employed] : employed [Significant Other] : lives with significant other [Sexually Active] : sexually active [Feels Safe at Home] : Feels safe at home [Fully functional (bathing, dressing, toileting, transferring, walking, feeding)] : Fully functional (bathing, dressing, toileting, transferring, walking, feeding) [Fully functional (using the telephone, shopping, preparing meals, housekeeping, doing laundry, using] : Fully functional and needs no help or supervision to perform IADLs (using the telephone, shopping, preparing meals, housekeeping, doing laundry, using transportation, managing medications and managing finances) [Never] : Never [de-identified] : exercises--  as trade   [de-identified] : fruits, veggies  [[id number]] : 0 [Change in mental status noted] : No change in mental status noted [Language] : denies difficulty with language [Reports changes in hearing] : Reports no changes in hearing [Reports changes in vision] : Reports no changes in vision [ColonoscopyDate] : 02/22 [FreeTextEntry2] :

## 2023-02-24 NOTE — HISTORY OF PRESENT ILLNESS
[FreeTextEntry1] : annual \par xanax for flying  [de-identified] : 56 yo m presents for annual \par no complaints today \par in need of xanax for flying

## 2023-03-03 ENCOUNTER — NON-APPOINTMENT (OUTPATIENT)
Age: 58
End: 2023-03-03

## 2023-03-03 LAB
25(OH)D3 SERPL-MCNC: 29.5 NG/ML
ALBUMIN SERPL ELPH-MCNC: 4.9 G/DL
ALP BLD-CCNC: 69 U/L
ALT SERPL-CCNC: 21 U/L
ANION GAP SERPL CALC-SCNC: 15 MMOL/L
AST SERPL-CCNC: 22 U/L
BASOPHILS # BLD AUTO: 0.06 K/UL
BASOPHILS NFR BLD AUTO: 0.9 %
BILIRUB SERPL-MCNC: 0.3 MG/DL
BUN SERPL-MCNC: 16 MG/DL
CALCIUM SERPL-MCNC: 9.9 MG/DL
CHLORIDE SERPL-SCNC: 100 MMOL/L
CHOLEST SERPL-MCNC: 185 MG/DL
CO2 SERPL-SCNC: 24 MMOL/L
CREAT SERPL-MCNC: 1.03 MG/DL
EGFR: 85 ML/MIN/1.73M2
EOSINOPHIL # BLD AUTO: 0.18 K/UL
EOSINOPHIL NFR BLD AUTO: 2.6 %
ESTIMATED AVERAGE GLUCOSE: 117 MG/DL
GLUCOSE SERPL-MCNC: 81 MG/DL
HBA1C MFR BLD HPLC: 5.7 %
HCT VFR BLD CALC: 40.8 %
HDLC SERPL-MCNC: 52 MG/DL
HGB BLD-MCNC: 13.9 G/DL
IMM GRANULOCYTES NFR BLD AUTO: 0.1 %
LDLC SERPL CALC-MCNC: 117 MG/DL
LYMPHOCYTES # BLD AUTO: 2.64 K/UL
LYMPHOCYTES NFR BLD AUTO: 38.2 %
MAN DIFF?: NORMAL
MCHC RBC-ENTMCNC: 31.5 PG
MCHC RBC-ENTMCNC: 34.1 GM/DL
MCV RBC AUTO: 92.5 FL
MONOCYTES # BLD AUTO: 0.55 K/UL
MONOCYTES NFR BLD AUTO: 8 %
NEUTROPHILS # BLD AUTO: 3.47 K/UL
NEUTROPHILS NFR BLD AUTO: 50.2 %
NONHDLC SERPL-MCNC: 133 MG/DL
PLATELET # BLD AUTO: 235 K/UL
POTASSIUM SERPL-SCNC: 4.2 MMOL/L
PROT SERPL-MCNC: 7.6 G/DL
PSA SERPL-MCNC: 3.53 NG/ML
RBC # BLD: 4.41 M/UL
RBC # FLD: 13.1 %
SODIUM SERPL-SCNC: 139 MMOL/L
TRIGL SERPL-MCNC: 79 MG/DL
TSH SERPL-ACNC: 1.16 UIU/ML
WBC # FLD AUTO: 6.91 K/UL

## 2023-03-13 ENCOUNTER — RX RENEWAL (OUTPATIENT)
Age: 58
End: 2023-03-13

## 2023-03-22 ENCOUNTER — APPOINTMENT (OUTPATIENT)
Dept: UROLOGY | Facility: CLINIC | Age: 58
End: 2023-03-22
Payer: COMMERCIAL

## 2023-03-22 VITALS
SYSTOLIC BLOOD PRESSURE: 108 MMHG | HEART RATE: 86 BPM | OXYGEN SATURATION: 96 % | TEMPERATURE: 98.3 F | DIASTOLIC BLOOD PRESSURE: 73 MMHG

## 2023-03-22 PROCEDURE — 99214 OFFICE O/P EST MOD 30 MIN: CPT

## 2023-03-23 NOTE — LETTER BODY
[Dear  ___] : Dear  [unfilled], [Courtesy Letter:] : I had the pleasure of seeing your patient, [unfilled], in my office today. [Please see my note below.] : Please see my note below. [Consult Closing:] : Thank you very much for allowing me to participate in the care of this patient.  If you have any questions, please do not hesitate to contact me. [Sincerely,] : Sincerely, [FreeTextEntry3] : Mery Jade MD

## 2023-03-23 NOTE — ASSESSMENT
[FreeTextEntry1] : Patient with history of elevated and fluctuating PSAs. Negative prostate biopsy in 2008, no prior prostate MRI. I spent this consultation discussing the implications of an elevated PSA including the fact that the PSA level may be affected by various factors including age, prostate size, ethnicity, use of medications, and concurrent prostatic inflammation. Serum PSA is generally proportional to the risk of prostate cancer but there is no specific PSA cut-off signifying prostate cancer. I have recommended prostate MRI at this time. \par \par Patient with microscopic hematuria, negative CT urogram and cystoscopy in 2022. If persistent microhematuria, plan for repeat evaluation every 3-5 years. \par \par Regarding chronic LUTS, patient has tried medication in the past and is not currently interested in exploring further treatment after thorough discussion of medication and surgical treatment options.\par  - UA, UCx\par  - Prostate MRI\par  - F/U to discuss

## 2023-03-23 NOTE — HISTORY OF PRESENT ILLNESS
[FreeTextEntry1] : 56 year old man with history of elevated PSA, LUTS, presents with microscopic hematuria.\par \par Patient previously had microhematuria (7 RBCs pHPF) with negative CT urogram and negative cystoscopy in 2022. 10 year smoking history. No personal or family history of  malignancy. No prior kidney stones or trauma. No fevers, chills, dysuria. \par \par He has history of elevated PSA previously, no prior prostate MRI. Negative prostate biopsyin 2008. \par \par PSA 3.53--2/24/23; 2.77--5/9/22; 3.32--2/25/22; 4.58--7/22/21\par \par He has chronic LUTS characterized by urgency, frequency, nocturia, dribbling, weak stream. He has tried flomax in the past, but stopped due to side effects. He did not feel the benefit was worth taking the medication. He denies fevers, chills, dysuria.\par \par IPSS 31, QOL 6

## 2023-05-11 LAB
APPEARANCE: CLEAR
BACTERIA UR CULT: NORMAL
BACTERIA: NEGATIVE
BILIRUBIN URINE: NEGATIVE
BLOOD URINE: NEGATIVE
COLOR: YELLOW
GLUCOSE QUALITATIVE U: NEGATIVE
HYALINE CASTS: 1 /LPF
KETONES URINE: NEGATIVE
LEUKOCYTE ESTERASE URINE: NEGATIVE
MICROSCOPIC-UA: NORMAL
NITRITE URINE: NEGATIVE
PH URINE: 6
PROTEIN URINE: NORMAL
RED BLOOD CELLS URINE: 11 /HPF
SPECIFIC GRAVITY URINE: 1.02
SQUAMOUS EPITHELIAL CELLS: 0 /HPF
UROBILINOGEN URINE: NORMAL
WHITE BLOOD CELLS URINE: 1 /HPF

## 2023-06-13 ENCOUNTER — RX RENEWAL (OUTPATIENT)
Age: 58
End: 2023-06-13

## 2023-07-10 ENCOUNTER — APPOINTMENT (OUTPATIENT)
Dept: FAMILY MEDICINE | Facility: CLINIC | Age: 58
End: 2023-07-10
Payer: COMMERCIAL

## 2023-07-10 VITALS
DIASTOLIC BLOOD PRESSURE: 73 MMHG | HEIGHT: 73 IN | HEART RATE: 68 BPM | BODY MASS INDEX: 27.33 KG/M2 | TEMPERATURE: 98.2 F | SYSTOLIC BLOOD PRESSURE: 108 MMHG | OXYGEN SATURATION: 97 % | WEIGHT: 206.25 LBS

## 2023-07-10 PROCEDURE — 36415 COLL VENOUS BLD VENIPUNCTURE: CPT

## 2023-07-10 PROCEDURE — 99214 OFFICE O/P EST MOD 30 MIN: CPT | Mod: 25

## 2023-07-10 NOTE — HISTORY OF PRESENT ILLNESS
[FreeTextEntry1] : follow up thyroid \par follow up cholesterol \par follow up prediabetes \par \par  [de-identified] : 59 yo m presents for follow up \par here for tsh, cholesterol, prediabetes \par elevated during cpe \par also interested in repeat PSA

## 2023-07-19 LAB
ALBUMIN SERPL ELPH-MCNC: 4.9 G/DL
ALP BLD-CCNC: 53 U/L
ALT SERPL-CCNC: 15 U/L
ANION GAP SERPL CALC-SCNC: 10 MMOL/L
AST SERPL-CCNC: 18 U/L
BILIRUB SERPL-MCNC: 0.4 MG/DL
BUN SERPL-MCNC: 17 MG/DL
CALCIUM SERPL-MCNC: 9.9 MG/DL
CHLORIDE SERPL-SCNC: 103 MMOL/L
CHOLEST SERPL-MCNC: 169 MG/DL
CO2 SERPL-SCNC: 25 MMOL/L
CREAT SERPL-MCNC: 1.12 MG/DL
EGFR: 77 ML/MIN/1.73M2
ESTIMATED AVERAGE GLUCOSE: 114 MG/DL
GLUCOSE SERPL-MCNC: 103 MG/DL
HBA1C MFR BLD HPLC: 5.6 %
HDLC SERPL-MCNC: 58 MG/DL
LDLC SERPL CALC-MCNC: 98 MG/DL
NONHDLC SERPL-MCNC: 111 MG/DL
POTASSIUM SERPL-SCNC: 4.4 MMOL/L
PROT SERPL-MCNC: 7.2 G/DL
PSA SERPL-MCNC: 3.24 NG/ML
SODIUM SERPL-SCNC: 138 MMOL/L
TRIGL SERPL-MCNC: 70 MG/DL
TSH SERPL-ACNC: 0.65 UIU/ML

## 2024-03-29 ENCOUNTER — APPOINTMENT (OUTPATIENT)
Dept: FAMILY MEDICINE | Facility: CLINIC | Age: 59
End: 2024-03-29
Payer: COMMERCIAL

## 2024-03-29 VITALS
TEMPERATURE: 98.2 F | DIASTOLIC BLOOD PRESSURE: 70 MMHG | HEART RATE: 66 BPM | WEIGHT: 211 LBS | OXYGEN SATURATION: 96 % | BODY MASS INDEX: 27.96 KG/M2 | HEIGHT: 73 IN | SYSTOLIC BLOOD PRESSURE: 108 MMHG

## 2024-03-29 DIAGNOSIS — R73.03 PREDIABETES.: ICD-10-CM

## 2024-03-29 DIAGNOSIS — E03.9 HYPOTHYROIDISM, UNSPECIFIED: ICD-10-CM

## 2024-03-29 DIAGNOSIS — J06.9 ACUTE UPPER RESPIRATORY INFECTION, UNSPECIFIED: ICD-10-CM

## 2024-03-29 DIAGNOSIS — E78.00 PURE HYPERCHOLESTEROLEMIA, UNSPECIFIED: ICD-10-CM

## 2024-03-29 PROCEDURE — G2211 COMPLEX E/M VISIT ADD ON: CPT

## 2024-03-29 PROCEDURE — 36415 COLL VENOUS BLD VENIPUNCTURE: CPT

## 2024-03-29 PROCEDURE — 99214 OFFICE O/P EST MOD 30 MIN: CPT

## 2024-03-29 RX ORDER — PROMETHAZINE HYDROCHLORIDE 6.25 MG/5ML
6.25 SOLUTION ORAL
Qty: 150 | Refills: 0 | Status: ACTIVE | COMMUNITY
Start: 2024-03-29 | End: 1900-01-01

## 2024-03-29 RX ORDER — ALPRAZOLAM 0.5 MG/1
0.5 TABLET ORAL
Qty: 10 | Refills: 0 | Status: ACTIVE | COMMUNITY
Start: 2022-02-25 | End: 1900-01-01

## 2024-03-29 RX ORDER — FLUTICASONE PROPIONATE 50 UG/1
50 SPRAY, METERED NASAL
Qty: 1 | Refills: 2 | Status: ACTIVE | COMMUNITY
Start: 2024-03-29 | End: 1900-01-01

## 2024-03-29 NOTE — HEALTH RISK ASSESSMENT
[0] : 2) Feeling down, depressed, or hopeless: Not at all (0) [PHQ-2 Negative - No further assessment needed] : PHQ-2 Negative - No further assessment needed [[id number]] : 0 [Former] : Former [15-19] : 15-19 [> 15 Years] : > 15 Years

## 2024-03-29 NOTE — PLAN
[FreeTextEntry1] : follow up labs, labs drawn in office  reviewed importance of healthy lifestyles-- diet, exercise  reviewed labs from last visit-- cbc, comp, tsh, a1c, lipid, psa (high chol, prediabetes, normal ascvd risk) encouraged compliance with meds  reviewed urology note and recs with patient-- interested in repeat psa testing today  cont with meds for anxiety while flying

## 2024-03-29 NOTE — HISTORY OF PRESENT ILLNESS
[FreeTextEntry1] : anxiety med refill  follow up thyroid and cholesterol  [de-identified] : 59 yo m presents for refill of meds for flying  also interested in labs for thyroid and cholesterol

## 2024-04-09 LAB
ALBUMIN SERPL ELPH-MCNC: 4.8 G/DL
ALP BLD-CCNC: 62 U/L
ALT SERPL-CCNC: 16 U/L
ANION GAP SERPL CALC-SCNC: 13 MMOL/L
AST SERPL-CCNC: 19 U/L
BILIRUB SERPL-MCNC: 0.3 MG/DL
BUN SERPL-MCNC: 16 MG/DL
CALCIUM SERPL-MCNC: 10 MG/DL
CHLORIDE SERPL-SCNC: 101 MMOL/L
CHOLEST SERPL-MCNC: 167 MG/DL
CO2 SERPL-SCNC: 25 MMOL/L
CREAT SERPL-MCNC: 1.05 MG/DL
EGFR: 83 ML/MIN/1.73M2
ESTIMATED AVERAGE GLUCOSE: 108 MG/DL
GLUCOSE SERPL-MCNC: 91 MG/DL
HBA1C MFR BLD HPLC: 5.4 %
HDLC SERPL-MCNC: 52 MG/DL
LDLC SERPL CALC-MCNC: 101 MG/DL
NONHDLC SERPL-MCNC: 115 MG/DL
POTASSIUM SERPL-SCNC: 4.1 MMOL/L
PROT SERPL-MCNC: 7.1 G/DL
PSA SERPL-MCNC: 5.03 NG/ML
SODIUM SERPL-SCNC: 138 MMOL/L
TRIGL SERPL-MCNC: 71 MG/DL
TSH SERPL-ACNC: 0.53 UIU/ML

## 2024-04-30 ENCOUNTER — APPOINTMENT (OUTPATIENT)
Dept: UROLOGY | Facility: CLINIC | Age: 59
End: 2024-04-30
Payer: COMMERCIAL

## 2024-04-30 VITALS
DIASTOLIC BLOOD PRESSURE: 78 MMHG | TEMPERATURE: 97.6 F | OXYGEN SATURATION: 95 % | HEIGHT: 73 IN | SYSTOLIC BLOOD PRESSURE: 121 MMHG | BODY MASS INDEX: 27.96 KG/M2 | HEART RATE: 67 BPM | WEIGHT: 211 LBS

## 2024-04-30 DIAGNOSIS — R39.9 UNSPECIFIED SYMPTOMS AND SIGNS INVOLVING THE GENITOURINARY SYSTEM: ICD-10-CM

## 2024-04-30 DIAGNOSIS — R31.29 OTHER MICROSCOPIC HEMATURIA: ICD-10-CM

## 2024-04-30 PROCEDURE — 99214 OFFICE O/P EST MOD 30 MIN: CPT

## 2024-04-30 NOTE — HISTORY OF PRESENT ILLNESS
[FreeTextEntry1] : 58 year old man with history of elevated PSA, LUTS, presents with microscopic hematuria.  Patient previously had microhematuria (7 RBCs pHPF) with negative CT urogram and negative cystoscopy in 2022. 10 year smoking history. No personal or family history of  malignancy. No prior kidney stones or trauma. No fevers, chills, dysuria.   He has history of elevated PSA previously, no prior prostate MRI. Negative prostate biopsy in 2008.   PSA  5.03--3/29/24; 3.53--2/24/23; 2.77--5/9/22; 3.32--2/25/22; 4.58--7/22/21  He has chronic LUTS characterized by urgency, frequency, nocturia, dribbling, weak stream. No significant change from prior. He has tried flomax in the past, but stopped due to side effects. He did not feel the benefit was worth taking the medication. He denies fevers, chills, dysuria.

## 2024-04-30 NOTE — ASSESSMENT
[FreeTextEntry1] : Patient with history of elevated and fluctuating PSAs. Negative prostate biopsy in 2008, no prior prostate MRI. I spent this consultation discussing the implications of an elevated PSA including the fact that the PSA level may be affected by various factors including age, prostate size, ethnicity, use of medications, and concurrent prostatic inflammation. Serum PSA is generally proportional to the risk of prostate cancer but there is no specific PSA cut-off signifying prostate cancer. I have recommended prostate MRI at this time.   Patient with microscopic hematuria, negative CT urogram and cystoscopy in 2022. Continue to monitor UA.  Regarding chronic LUTS, patient has tried medication in the past and is not currently interested in exploring further treatment.  - Prostate MRI  - F/U to discuss

## 2024-05-15 RX ORDER — LEVOTHYROXINE SODIUM 0.14 MG/1
137 TABLET ORAL
Qty: 90 | Refills: 0 | Status: ACTIVE | COMMUNITY
Start: 2022-01-05 | End: 1900-01-01

## 2024-05-23 ENCOUNTER — APPOINTMENT (OUTPATIENT)
Dept: MRI IMAGING | Facility: HOSPITAL | Age: 59
End: 2024-05-23

## 2024-05-29 ENCOUNTER — APPOINTMENT (OUTPATIENT)
Dept: UROLOGY | Facility: CLINIC | Age: 59
End: 2024-05-29
Payer: COMMERCIAL

## 2024-05-29 DIAGNOSIS — N40.0 BENIGN PROSTATIC HYPERPLASIA WITHOUT LOWER URINARY TRACT SYMPMS: ICD-10-CM

## 2024-05-29 DIAGNOSIS — R97.20 BENIGN PROSTATIC HYPERPLASIA WITHOUT LOWER URINARY TRACT SYMPMS: ICD-10-CM

## 2024-05-29 PROCEDURE — 99442: CPT

## 2024-05-29 NOTE — HISTORY OF PRESENT ILLNESS
[Home] : at home, [unfilled] , at the time of the visit. [Medical Office: ([facility])___] : at the medical office located in  [Verbal consent obtained from patient] : the patient, [unfilled] [FreeTextEntry1] : 58 year old man with history of elevated PSA, LUTS, presents with microscopic hematuria.  Patient previously had microhematuria (7 RBCs pHPF) with negative CT urogram and negative cystoscopy in 2022. 10 year smoking history. No personal or family history of  malignancy. No prior kidney stones or trauma. No fevers, chills, dysuria.   He has history of elevated PSA previously, no prior prostate MRI. Negative prostate biopsy in 2008.   PSA  5.03--3/29/24; 3.53--2/24/23; 2.77--5/9/22; 3.32--2/25/22; 4.58--7/22/21  Prostate MRI 5/21/24--33 cc gland, 7 mm PRIADS 3 lesion in the left PZpm [id number]  He has chronic LUTS characterized by urgency, frequency, nocturia, dribbling, weak stream. No significant change from prior. He has tried flomax in the past, but stopped due to side effects. He did not feel the benefit was worth taking the medication. He denies fevers, chills, dysuria.

## 2024-05-29 NOTE — ASSESSMENT
[FreeTextEntry1] : Patient with history of elevated and fluctuating PSAs. Negative prostate biopsy in 2008, no prior prostate MRI. Recent PSA 5.0, MRI with 33 cc gland, 7 mm PRIADS 3 lesion in the left PZpm [id number].  Prostate cancer screening: the patient and I spoke at length about prostate cancer screening, its risks and its benefits.  He understands that many men with prostate cancer will die with the disease rather than of it and we also discussed the results large multi-center [place] and  prostate cancer screening trials. He also understands that PSA in and of itself does not diagnose prostate cancer but only assesses risk to a certain degree. The patient understands that to definitively screen for prostate cancer, a biopsy is required and this procedure has risks, including bleeding, infection, ED and urinary retention.  The patient opted to move forward with the biopsy, which is performed via a transperineal approach with MRI/US guided fusion targeting.  The patient is aware to expect hematuria x 2 weeks and up to 4 weeks of hematospermia.  There is a risk of infection albeit much lower than a transrectal approach. In some cases patients can experience erectile dysfunction but this is usually self limiting.  Any fever/chills after the biopsy the patient is to contact the office and go to the ER for an immediate evaluation. He has been given paper instructions outlining these items - which includes medications to avoid prior to surgery.  1. CBC, BMP, PSA, Covid Test, UA UCx 2. PCP Clearance 3. TP biopsy at [facility] 4. follow up 2 weeks after biopsy with his primary urologist or ourselves. 5. we will call with the path results once they are resulted.

## 2024-06-07 ENCOUNTER — APPOINTMENT (OUTPATIENT)
Dept: FAMILY MEDICINE | Facility: CLINIC | Age: 59
End: 2024-06-07

## 2024-06-18 ENCOUNTER — APPOINTMENT (OUTPATIENT)
Dept: UROLOGY | Facility: AMBULATORY SURGERY CENTER | Age: 59
End: 2024-06-18

## 2024-10-04 ENCOUNTER — APPOINTMENT (OUTPATIENT)
Dept: FAMILY MEDICINE | Facility: CLINIC | Age: 59
End: 2024-10-04

## 2024-10-04 DIAGNOSIS — Z23 ENCOUNTER FOR IMMUNIZATION: ICD-10-CM

## 2024-10-04 PROCEDURE — 90471 IMMUNIZATION ADMIN: CPT

## 2024-10-04 PROCEDURE — 90656 IIV3 VACC NO PRSV 0.5 ML IM: CPT

## 2024-10-08 ENCOUNTER — APPOINTMENT (OUTPATIENT)
Dept: ORTHOPEDIC SURGERY | Facility: CLINIC | Age: 59
End: 2024-10-08
Payer: COMMERCIAL

## 2024-10-08 ENCOUNTER — MED ADMIN CHARGE (OUTPATIENT)
Age: 59
End: 2024-10-08

## 2024-10-08 DIAGNOSIS — M79.645 PAIN IN LEFT FINGER(S): ICD-10-CM

## 2024-10-08 PROCEDURE — 99214 OFFICE O/P EST MOD 30 MIN: CPT

## 2024-10-08 PROCEDURE — 73140 X-RAY EXAM OF FINGER(S): CPT | Mod: FA

## 2024-12-27 ENCOUNTER — APPOINTMENT (OUTPATIENT)
Dept: FAMILY MEDICINE | Facility: CLINIC | Age: 59
End: 2024-12-27

## 2025-02-14 ENCOUNTER — APPOINTMENT (OUTPATIENT)
Dept: FAMILY MEDICINE | Facility: CLINIC | Age: 60
End: 2025-02-14
Payer: COMMERCIAL

## 2025-02-14 VITALS
SYSTOLIC BLOOD PRESSURE: 108 MMHG | HEIGHT: 73 IN | WEIGHT: 221 LBS | TEMPERATURE: 97.9 F | HEART RATE: 67 BPM | DIASTOLIC BLOOD PRESSURE: 78 MMHG | OXYGEN SATURATION: 95 % | BODY MASS INDEX: 29.29 KG/M2

## 2025-02-14 DIAGNOSIS — R73.03 PREDIABETES.: ICD-10-CM

## 2025-02-14 DIAGNOSIS — E78.00 PURE HYPERCHOLESTEROLEMIA, UNSPECIFIED: ICD-10-CM

## 2025-02-14 DIAGNOSIS — R97.20 BENIGN PROSTATIC HYPERPLASIA WITHOUT LOWER URINARY TRACT SYMPMS: ICD-10-CM

## 2025-02-14 DIAGNOSIS — F40.243 FEAR OF FLYING: ICD-10-CM

## 2025-02-14 DIAGNOSIS — N40.0 BENIGN PROSTATIC HYPERPLASIA WITHOUT LOWER URINARY TRACT SYMPMS: ICD-10-CM

## 2025-02-14 DIAGNOSIS — E03.9 HYPOTHYROIDISM, UNSPECIFIED: ICD-10-CM

## 2025-02-14 PROCEDURE — G2211 COMPLEX E/M VISIT ADD ON: CPT

## 2025-02-14 PROCEDURE — 99214 OFFICE O/P EST MOD 30 MIN: CPT

## 2025-02-14 PROCEDURE — 36415 COLL VENOUS BLD VENIPUNCTURE: CPT

## 2025-02-15 ENCOUNTER — TRANSCRIPTION ENCOUNTER (OUTPATIENT)
Age: 60
End: 2025-02-15

## 2025-02-18 LAB
ALBUMIN SERPL ELPH-MCNC: 4.7 G/DL
ALP BLD-CCNC: 59 U/L
ALT SERPL-CCNC: 22 U/L
ANION GAP SERPL CALC-SCNC: 12 MMOL/L
AST SERPL-CCNC: 29 U/L
BASOPHILS # BLD AUTO: 0.07 K/UL
BASOPHILS NFR BLD AUTO: 0.9 %
BILIRUB SERPL-MCNC: 0.3 MG/DL
BUN SERPL-MCNC: 17 MG/DL
CALCIUM SERPL-MCNC: 9.9 MG/DL
CHLORIDE SERPL-SCNC: 102 MMOL/L
CHOLEST SERPL-MCNC: 150 MG/DL
CO2 SERPL-SCNC: 26 MMOL/L
CREAT SERPL-MCNC: 1.02 MG/DL
EGFR: 85 ML/MIN/1.73M2
EOSINOPHIL # BLD AUTO: 0.19 K/UL
EOSINOPHIL NFR BLD AUTO: 2.5 %
ESTIMATED AVERAGE GLUCOSE: 114 MG/DL
GLUCOSE SERPL-MCNC: 86 MG/DL
HBA1C MFR BLD HPLC: 5.6 %
HCT VFR BLD CALC: 43.1 %
HDLC SERPL-MCNC: 51 MG/DL
HGB BLD-MCNC: 14.6 G/DL
IMM GRANULOCYTES NFR BLD AUTO: 0.1 %
LDLC SERPL CALC-MCNC: 85 MG/DL
LYMPHOCYTES # BLD AUTO: 2.53 K/UL
LYMPHOCYTES NFR BLD AUTO: 33.5 %
MAN DIFF?: NORMAL
MCHC RBC-ENTMCNC: 31.4 PG
MCHC RBC-ENTMCNC: 33.9 G/DL
MCV RBC AUTO: 92.7 FL
MONOCYTES # BLD AUTO: 0.71 K/UL
MONOCYTES NFR BLD AUTO: 9.4 %
NEUTROPHILS # BLD AUTO: 4.05 K/UL
NEUTROPHILS NFR BLD AUTO: 53.6 %
NONHDLC SERPL-MCNC: 99 MG/DL
PLATELET # BLD AUTO: 239 K/UL
POTASSIUM SERPL-SCNC: 4.3 MMOL/L
PROT SERPL-MCNC: 7.1 G/DL
PSA SERPL-MCNC: 4.81 NG/ML
RBC # BLD: 4.65 M/UL
RBC # FLD: 13.3 %
SODIUM SERPL-SCNC: 140 MMOL/L
TRIGL SERPL-MCNC: 72 MG/DL
TSH SERPL-ACNC: 0.74 UIU/ML
WBC # FLD AUTO: 7.56 K/UL

## 2025-05-27 ENCOUNTER — RX RENEWAL (OUTPATIENT)
Age: 60
End: 2025-05-27

## 2025-08-25 ENCOUNTER — RX RENEWAL (OUTPATIENT)
Age: 60
End: 2025-08-25

## 2025-09-05 ENCOUNTER — APPOINTMENT (OUTPATIENT)
Dept: FAMILY MEDICINE | Facility: CLINIC | Age: 60
End: 2025-09-05

## 2025-09-05 VITALS
BODY MASS INDEX: 28.76 KG/M2 | HEART RATE: 79 BPM | SYSTOLIC BLOOD PRESSURE: 104 MMHG | OXYGEN SATURATION: 96 % | TEMPERATURE: 98.1 F | DIASTOLIC BLOOD PRESSURE: 70 MMHG | WEIGHT: 217 LBS | HEIGHT: 73 IN

## 2025-09-05 DIAGNOSIS — N40.0 BENIGN PROSTATIC HYPERPLASIA WITHOUT LOWER URINARY TRACT SYMPMS: ICD-10-CM

## 2025-09-05 DIAGNOSIS — R97.20 BENIGN PROSTATIC HYPERPLASIA WITHOUT LOWER URINARY TRACT SYMPMS: ICD-10-CM

## 2025-09-05 DIAGNOSIS — E78.00 PURE HYPERCHOLESTEROLEMIA, UNSPECIFIED: ICD-10-CM

## 2025-09-05 DIAGNOSIS — E03.9 HYPOTHYROIDISM, UNSPECIFIED: ICD-10-CM

## 2025-09-05 PROCEDURE — 99214 OFFICE O/P EST MOD 30 MIN: CPT

## 2025-09-05 PROCEDURE — 36415 COLL VENOUS BLD VENIPUNCTURE: CPT

## 2025-09-05 PROCEDURE — G2211 COMPLEX E/M VISIT ADD ON: CPT

## 2025-09-09 LAB
ALBUMIN SERPL ELPH-MCNC: 4.6 G/DL
ALP BLD-CCNC: 56 U/L
ALT SERPL-CCNC: 23 U/L
ANION GAP SERPL CALC-SCNC: 13 MMOL/L
AST SERPL-CCNC: 21 U/L
BILIRUB SERPL-MCNC: 0.3 MG/DL
BUN SERPL-MCNC: 15 MG/DL
CALCIUM SERPL-MCNC: 10 MG/DL
CHLORIDE SERPL-SCNC: 104 MMOL/L
CHOLEST SERPL-MCNC: 173 MG/DL
CO2 SERPL-SCNC: 22 MMOL/L
CREAT SERPL-MCNC: 0.95 MG/DL
EGFRCR SERPLBLD CKD-EPI 2021: 92 ML/MIN/1.73M2
ESTIMATED AVERAGE GLUCOSE: 111 MG/DL
GLUCOSE SERPL-MCNC: 99 MG/DL
HBA1C MFR BLD HPLC: 5.5 %
HDLC SERPL-MCNC: 50 MG/DL
LDLC SERPL-MCNC: 107 MG/DL
NONHDLC SERPL-MCNC: 123 MG/DL
POTASSIUM SERPL-SCNC: 4.4 MMOL/L
PROT SERPL-MCNC: 6.9 G/DL
PSA SERPL-MCNC: 3.48 NG/ML
SODIUM SERPL-SCNC: 139 MMOL/L
TRIGL SERPL-MCNC: 89 MG/DL
TSH SERPL-ACNC: 0.61 UIU/ML